# Patient Record
Sex: MALE | Race: BLACK OR AFRICAN AMERICAN | NOT HISPANIC OR LATINO
[De-identification: names, ages, dates, MRNs, and addresses within clinical notes are randomized per-mention and may not be internally consistent; named-entity substitution may affect disease eponyms.]

---

## 2018-04-02 ENCOUNTER — APPOINTMENT (OUTPATIENT)
Dept: ANTEPARTUM | Facility: CLINIC | Age: 43
End: 2018-04-02

## 2018-04-02 PROBLEM — Z00.00 ENCOUNTER FOR PREVENTIVE HEALTH EXAMINATION: Status: ACTIVE | Noted: 2018-04-02

## 2018-10-07 ENCOUNTER — TRANSCRIPTION ENCOUNTER (OUTPATIENT)
Age: 43
End: 2018-10-07

## 2018-10-08 ENCOUNTER — OUTPATIENT (OUTPATIENT)
Dept: OUTPATIENT SERVICES | Facility: HOSPITAL | Age: 43
LOS: 1 days | End: 2018-10-08
Payer: COMMERCIAL

## 2018-10-08 DIAGNOSIS — M77.02 MEDIAL EPICONDYLITIS, LEFT ELBOW: ICD-10-CM

## 2018-11-01 PROCEDURE — 76000 FLUOROSCOPY <1 HR PHYS/QHP: CPT

## 2018-11-01 PROCEDURE — 77002 NEEDLE LOCALIZATION BY XRAY: CPT

## 2018-11-01 PROCEDURE — 20605 DRAIN/INJ JOINT/BURSA W/O US: CPT | Mod: XS,LT

## 2018-11-01 PROCEDURE — 62321 NJX INTERLAMINAR CRV/THRC: CPT

## 2020-02-13 ENCOUNTER — EMERGENCY (EMERGENCY)
Facility: HOSPITAL | Age: 45
LOS: 1 days | Discharge: HOME | End: 2020-02-13
Attending: EMERGENCY MEDICINE
Payer: COMMERCIAL

## 2020-02-13 VITALS
TEMPERATURE: 98 F | RESPIRATION RATE: 18 BRPM | WEIGHT: 154.98 LBS | DIASTOLIC BLOOD PRESSURE: 91 MMHG | HEART RATE: 71 BPM | SYSTOLIC BLOOD PRESSURE: 123 MMHG | HEIGHT: 72 IN | OXYGEN SATURATION: 99 %

## 2020-02-13 LAB
ALBUMIN SERPL ELPH-MCNC: 4.7 G/DL — SIGNIFICANT CHANGE UP (ref 3.3–5.2)
ALP SERPL-CCNC: 77 U/L — SIGNIFICANT CHANGE UP (ref 40–120)
ALT FLD-CCNC: 14 U/L — SIGNIFICANT CHANGE UP
ANION GAP SERPL CALC-SCNC: 12 MMOL/L — SIGNIFICANT CHANGE UP (ref 5–17)
AST SERPL-CCNC: 23 U/L — SIGNIFICANT CHANGE UP
BASOPHILS # BLD AUTO: 0.02 K/UL — SIGNIFICANT CHANGE UP (ref 0–0.2)
BASOPHILS NFR BLD AUTO: 0.3 % — SIGNIFICANT CHANGE UP (ref 0–2)
BILIRUB SERPL-MCNC: 0.7 MG/DL — SIGNIFICANT CHANGE UP (ref 0.4–2)
BUN SERPL-MCNC: 27 MG/DL — HIGH (ref 8–20)
CALCIUM SERPL-MCNC: 9.7 MG/DL — SIGNIFICANT CHANGE UP (ref 8.6–10.2)
CHLORIDE SERPL-SCNC: 97 MMOL/L — LOW (ref 98–107)
CO2 SERPL-SCNC: 27 MMOL/L — SIGNIFICANT CHANGE UP (ref 22–29)
CREAT SERPL-MCNC: 1.17 MG/DL — SIGNIFICANT CHANGE UP (ref 0.5–1.3)
EOSINOPHIL # BLD AUTO: 0.04 K/UL — SIGNIFICANT CHANGE UP (ref 0–0.5)
EOSINOPHIL NFR BLD AUTO: 0.7 % — SIGNIFICANT CHANGE UP (ref 0–6)
GLUCOSE SERPL-MCNC: 92 MG/DL — SIGNIFICANT CHANGE UP (ref 70–99)
HCT VFR BLD CALC: 46.7 % — SIGNIFICANT CHANGE UP (ref 39–50)
HGB BLD-MCNC: 16.4 G/DL — SIGNIFICANT CHANGE UP (ref 13–17)
HIV 1 & 2 AB SERPL IA.RAPID: SIGNIFICANT CHANGE UP
IMM GRANULOCYTES NFR BLD AUTO: 0.3 % — SIGNIFICANT CHANGE UP (ref 0–1.5)
LIDOCAIN IGE QN: 16 U/L — LOW (ref 22–51)
LYMPHOCYTES # BLD AUTO: 1.88 K/UL — SIGNIFICANT CHANGE UP (ref 1–3.3)
LYMPHOCYTES # BLD AUTO: 32.7 % — SIGNIFICANT CHANGE UP (ref 13–44)
MCHC RBC-ENTMCNC: 28 PG — SIGNIFICANT CHANGE UP (ref 27–34)
MCHC RBC-ENTMCNC: 35.1 GM/DL — SIGNIFICANT CHANGE UP (ref 32–36)
MCV RBC AUTO: 79.8 FL — LOW (ref 80–100)
MONOCYTES # BLD AUTO: 0.47 K/UL — SIGNIFICANT CHANGE UP (ref 0–0.9)
MONOCYTES NFR BLD AUTO: 8.2 % — SIGNIFICANT CHANGE UP (ref 2–14)
NEUTROPHILS # BLD AUTO: 3.32 K/UL — SIGNIFICANT CHANGE UP (ref 1.8–7.4)
NEUTROPHILS NFR BLD AUTO: 57.8 % — SIGNIFICANT CHANGE UP (ref 43–77)
PLATELET # BLD AUTO: 177 K/UL — SIGNIFICANT CHANGE UP (ref 150–400)
POTASSIUM SERPL-MCNC: 5.1 MMOL/L — SIGNIFICANT CHANGE UP (ref 3.5–5.3)
POTASSIUM SERPL-SCNC: 5.1 MMOL/L — SIGNIFICANT CHANGE UP (ref 3.5–5.3)
PROT SERPL-MCNC: 8.4 G/DL — SIGNIFICANT CHANGE UP (ref 6.6–8.7)
RBC # BLD: 5.85 M/UL — HIGH (ref 4.2–5.8)
RBC # FLD: 12.6 % — SIGNIFICANT CHANGE UP (ref 10.3–14.5)
SODIUM SERPL-SCNC: 136 MMOL/L — SIGNIFICANT CHANGE UP (ref 135–145)
WBC # BLD: 5.75 K/UL — SIGNIFICANT CHANGE UP (ref 3.8–10.5)
WBC # FLD AUTO: 5.75 K/UL — SIGNIFICANT CHANGE UP (ref 3.8–10.5)

## 2020-02-13 PROCEDURE — 96374 THER/PROPH/DIAG INJ IV PUSH: CPT

## 2020-02-13 PROCEDURE — 86703 HIV-1/HIV-2 1 RESULT ANTBDY: CPT

## 2020-02-13 PROCEDURE — 99284 EMERGENCY DEPT VISIT MOD MDM: CPT

## 2020-02-13 PROCEDURE — 83690 ASSAY OF LIPASE: CPT

## 2020-02-13 PROCEDURE — 80053 COMPREHEN METABOLIC PANEL: CPT

## 2020-02-13 PROCEDURE — 99284 EMERGENCY DEPT VISIT MOD MDM: CPT | Mod: 25

## 2020-02-13 PROCEDURE — 85027 COMPLETE CBC AUTOMATED: CPT

## 2020-02-13 PROCEDURE — 36415 COLL VENOUS BLD VENIPUNCTURE: CPT

## 2020-02-13 RX ORDER — ONDANSETRON 8 MG/1
1 TABLET, FILM COATED ORAL
Qty: 9 | Refills: 0
Start: 2020-02-13 | End: 2020-02-15

## 2020-02-13 RX ORDER — ONDANSETRON 8 MG/1
4 TABLET, FILM COATED ORAL ONCE
Refills: 0 | Status: COMPLETED | OUTPATIENT
Start: 2020-02-13 | End: 2020-02-13

## 2020-02-13 RX ORDER — SODIUM CHLORIDE 9 MG/ML
1000 INJECTION INTRAMUSCULAR; INTRAVENOUS; SUBCUTANEOUS ONCE
Refills: 0 | Status: COMPLETED | OUTPATIENT
Start: 2020-02-13 | End: 2020-02-13

## 2020-02-13 RX ADMIN — SODIUM CHLORIDE 1000 MILLILITER(S): 9 INJECTION INTRAMUSCULAR; INTRAVENOUS; SUBCUTANEOUS at 14:35

## 2020-02-13 RX ADMIN — ONDANSETRON 4 MILLIGRAM(S): 8 TABLET, FILM COATED ORAL at 14:34

## 2020-02-13 NOTE — ED STATDOCS - PATIENT PORTAL LINK FT
You can access the FollowMyHealth Patient Portal offered by [facility] by registering at the following website: [email or web address]. By joining [employer]’s FollowMyHealth portal, you will also be able to view your health information using other applications (apps) compatible with our system.

## 2020-02-13 NOTE — ED STATDOCS - PROGRESS NOTE DETAILS
PA note: Labs unremarkable. Pt feeling well at this time, tolerating PO intake. No abdominal tenderness on exam. Pt provided with rx for zofran and instructed to f/u pmd. Stable for dc

## 2020-02-13 NOTE — ED STATDOCS - NSFOLLOWUPINSTRUCTIONS_ED_ALL_ED_FT
- Follow up with your doctor within 2-3 days.   - Return to the ED for any new or worsening symptoms.   - Use zofran 4mg ODT every 8 hours as needed for vomiting     Nausea / Vomiting    Nausea is the feeling that you have to vomit. As nausea gets worse, it can lead to vomiting. Vomiting puts you at an increased risk for dehydration. Older adults and people with other diseases or a weak immune system are at higher risk for dehydration. Drink clear fluids in small but frequent amounts as tolerated. Eat bland, easy-to-digest foods in small amounts as tolerated.    SEEK IMMEDIATE MEDICAL CARE IF YOU HAVE ANY OF THE FOLLOWING SYMPTOMS: fever, inability to keep sufficient fluids down, black or bloody vomitus, black or bloody stools, lightheadedness/dizziness, chest pain, severe headache, rash, shortness of breath, cold or clammy skin, confusion, pain with urination, or any signs of dehydration.

## 2020-02-13 NOTE — ED STATDOCS - CLINICAL SUMMARY MEDICAL DECISION MAKING FREE TEXT BOX
Patient presenting with flu like symptoms, N/V/D, myalgia. Unable to tolerate PO at home. Obtain labs. Give Zofran, fluids. Reassess.

## 2020-02-13 NOTE — ED STATDOCS - OBJECTIVE STATEMENT
45 y/o M pt with PMHx of cervical spine surgery presents to the ED c/o vomiting. Associated diarrhea, weakness, dizziness, headache, fatigue, body aches, sweats, fever, and abdominal pains, for several days. Believes he contracted the flu from his son. Limited PO intake at home. No recent travel. Denies rash, cough, congestion. No flu shot this year.

## 2020-02-13 NOTE — ED ADULT TRIAGE NOTE - CHIEF COMPLAINT QUOTE
Patient arrived to ED today with c/o diarrhea, vomiting, weakness, dizziness, headache, and abdominal pains.

## 2022-12-15 ENCOUNTER — EMERGENCY (EMERGENCY)
Facility: HOSPITAL | Age: 47
LOS: 1 days | Discharge: HOME | End: 2022-12-15
Attending: EMERGENCY MEDICINE
Payer: COMMERCIAL

## 2022-12-15 VITALS
OXYGEN SATURATION: 97 % | HEART RATE: 73 BPM | TEMPERATURE: 99 F | SYSTOLIC BLOOD PRESSURE: 112 MMHG | RESPIRATION RATE: 18 BRPM | DIASTOLIC BLOOD PRESSURE: 76 MMHG

## 2022-12-15 LAB
FLUAV H1 2009 PAND RNA SPEC QL NAA+PROBE: DETECTED
FLUAV SUBTYP SPEC NAA+PROBE: DETECTED
RAPID RVP RESULT: DETECTED
SARS-COV-2 RNA SPEC QL NAA+PROBE: SIGNIFICANT CHANGE UP

## 2022-12-15 PROCEDURE — 99283 EMERGENCY DEPT VISIT LOW MDM: CPT

## 2022-12-15 PROCEDURE — 0225U NFCT DS DNA&RNA 21 SARSCOV2: CPT

## 2022-12-15 RX ORDER — IBUPROFEN 200 MG
600 TABLET ORAL ONCE
Refills: 0 | Status: COMPLETED | OUTPATIENT
Start: 2022-12-15 | End: 2022-12-15

## 2022-12-15 RX ADMIN — Medication 600 MILLIGRAM(S): at 19:15

## 2022-12-15 NOTE — ED STATDOCS - CLINICAL SUMMARY MEDICAL DECISION MAKING FREE TEXT BOX
46 y/o with 4 days with viral syndrome: general myalgia, sweats, nausea and diarrhea. Will get RVP, give Motrin and discharge.

## 2022-12-15 NOTE — ED STATDOCS - OBJECTIVE STATEMENT
46 y/o male with no pmhx, c/o 4 days of cold sweats, body aches, and runny nose with new cough. +sick contact (nephew). Pt is not flu vaccinated. Denies fever or vomiting. +diarrhea this morning and nausea.

## 2022-12-15 NOTE — ED STATDOCS - PATIENT PORTAL LINK FT
You can access the FollowMyHealth Patient Portal offered by [facility] by registering at the following website: [email or web address]. By joining [employer]’s FollowMyHealth portal, you will also be able to view your health information using other applications (apps) compatible with our system.

## 2022-12-16 PROBLEM — Z78.9 OTHER SPECIFIED HEALTH STATUS: Chronic | Status: ACTIVE | Noted: 2020-02-19

## 2023-05-20 ENCOUNTER — EMERGENCY (EMERGENCY)
Facility: HOSPITAL | Age: 48
LOS: 1 days | Discharge: HOME | End: 2023-05-20
Attending: EMERGENCY MEDICINE
Payer: COMMERCIAL

## 2023-05-20 VITALS
RESPIRATION RATE: 18 BRPM | HEART RATE: 65 BPM | HEIGHT: 72 IN | SYSTOLIC BLOOD PRESSURE: 131 MMHG | TEMPERATURE: 99 F | DIASTOLIC BLOOD PRESSURE: 86 MMHG | OXYGEN SATURATION: 95 % | WEIGHT: 145.06 LBS

## 2023-05-20 LAB
APPEARANCE UR: CLEAR — SIGNIFICANT CHANGE UP
BACTERIA # UR AUTO: ABNORMAL
BILIRUB UR-MCNC: NEGATIVE — SIGNIFICANT CHANGE UP
COLOR SPEC: YELLOW — SIGNIFICANT CHANGE UP
DIFF PNL FLD: ABNORMAL
EPI CELLS # UR: SIGNIFICANT CHANGE UP
GLUCOSE UR QL: NEGATIVE MG/DL — SIGNIFICANT CHANGE UP
HIV 1 & 2 AB SERPL IA.RAPID: SIGNIFICANT CHANGE UP
KETONES UR-MCNC: ABNORMAL
LEUKOCYTE ESTERASE UR-ACNC: ABNORMAL
NITRITE UR-MCNC: NEGATIVE — SIGNIFICANT CHANGE UP
PH UR: 6 — SIGNIFICANT CHANGE UP (ref 5–8)
PROT UR-MCNC: NEGATIVE — SIGNIFICANT CHANGE UP
RBC CASTS # UR COMP ASSIST: SIGNIFICANT CHANGE UP /HPF (ref 0–4)
SP GR SPEC: 1.02 — SIGNIFICANT CHANGE UP (ref 1.01–1.02)
T PALLIDUM AB TITR SER: NEGATIVE — SIGNIFICANT CHANGE UP
UROBILINOGEN FLD QL: 1 MG/DL
WBC UR QL: ABNORMAL /HPF (ref 0–5)

## 2023-05-20 PROCEDURE — 99284 EMERGENCY DEPT VISIT MOD MDM: CPT

## 2023-05-20 PROCEDURE — 36415 COLL VENOUS BLD VENIPUNCTURE: CPT

## 2023-05-20 PROCEDURE — 87591 N.GONORRHOEAE DNA AMP PROB: CPT

## 2023-05-20 PROCEDURE — 81001 URINALYSIS AUTO W/SCOPE: CPT

## 2023-05-20 PROCEDURE — 87086 URINE CULTURE/COLONY COUNT: CPT

## 2023-05-20 PROCEDURE — 99283 EMERGENCY DEPT VISIT LOW MDM: CPT

## 2023-05-20 PROCEDURE — 87491 CHLMYD TRACH DNA AMP PROBE: CPT

## 2023-05-20 PROCEDURE — 86780 TREPONEMA PALLIDUM: CPT

## 2023-05-20 PROCEDURE — 86703 HIV-1/HIV-2 1 RESULT ANTBDY: CPT

## 2023-05-20 RX ORDER — CIPROFLOXACIN LACTATE 400MG/40ML
1 VIAL (ML) INTRAVENOUS
Qty: 14 | Refills: 0
Start: 2023-05-20 | End: 2023-05-26

## 2023-05-20 RX ORDER — CIPROFLOXACIN LACTATE 400MG/40ML
500 VIAL (ML) INTRAVENOUS ONCE
Refills: 0 | Status: COMPLETED | OUTPATIENT
Start: 2023-05-20 | End: 2023-05-20

## 2023-05-20 RX ADMIN — Medication 500 MILLIGRAM(S): at 04:14

## 2023-05-20 NOTE — ED PROVIDER NOTE - CARE PROVIDER_API CALL
Chucky Cisneros)  Urology  [address], [address]  [place] [address]  Phone: [phone]  Fax: [phone]  Follow Up Time:

## 2023-05-20 NOTE — ED ADULT TRIAGE NOTE - CHIEF COMPLAINT QUOTE
Patient presents to ED with c/o urinary symptoms, frequent urination and urgency times 3 days ago.  Denies burning on urination/pain.  Patient has had same symptoms in the past with UTI.

## 2023-05-20 NOTE — ED PROVIDER NOTE - ATTENDING APP SHARED VISIT CONTRIBUTION OF CARE
46 yo M c/o UTI s/s with assoc urinary urgency/frequency x last 3 days.  pt only sexually active with 1 partner.  No assoc fever, vomiting, testicular pain, penile d.c or lesions  or back pain.  UA as noted.  Rx Cipro with referral to Urology.  Pt requesting testing for STI's

## 2023-05-20 NOTE — ED ADULT NURSE NOTE - OBJECTIVE STATEMENT
Pt presents to ED c/o urinary symptoms. Pt states he has been experiencing urinary frequency and urgency for past 3 days. Pt states he had sex 3 days ago and then symptoms began. PMH UTI 1 year ago and pt states his symptoms are similar to when he had a UTI. Denies fever, chills, n/v, discharge, abdominal or groin pain.

## 2023-05-20 NOTE — ED PROVIDER NOTE - CLINICAL SUMMARY MEDICAL DECISION MAKING FREE TEXT BOX
48 yo male with no pmhx presents with urinary urgency/frequency x3 days. UA with 26-50 WBC's and mod leuk esterase, abx given. urology f/u explained. strict return precautions explained

## 2023-05-20 NOTE — ED PROVIDER NOTE - PHYSICAL EXAMINATION
Gen: No acute distress, non toxic  HEENT: Mucous membranes moist, pink conjunctivae, EOMI. PERRL. Airway patent   CV: RRR, nl s1/s2.  Resp: CTAB, normal rate and effort  GI: Abdomen soft, NT, ND. No rebound, no guarding  : No CVAT b/l  Neuro: A&O x 3, moving all 4 extremities  MSK: No spine or joint tenderness to palpation  Skin: No rashes. intact and perfused.

## 2023-05-20 NOTE — ED PROVIDER NOTE - PATIENT PORTAL LINK FT
You can access the FollowMyHealth Patient Portal offered by [facility] by registering at the following website: [email or web address]. By joining [employer]’s FollowMyHealth portal, you will also be able to view your health information using other applications (apps) compatible with our system.

## 2023-05-20 NOTE — ED PROVIDER NOTE - NSFOLLOWUPINSTRUCTIONS_ED_ALL_ED_FT
- Please follow-up with your primary care doctor in the next 5-7 days.  Please call tomorrow for an appointment.  If you cannot follow-up with your primary care doctor please return to the ED for any urgent issues.  - You were given a copy of the tests performed today.  Please bring the results with you and review them with your primary care doctor.  - If you have any worsening of symptoms or any other concerns please return to the ED immediately.  - Please continue taking your home medications as directed.   - Take antibiotics as prescribed. Take medication with food to prevent upset stomach.   - Follow up with the urologist within 2-3 days.     Urinary Tract Infection    A urinary tract infection (UTI) is an infection of any part of the urinary tract, which includes the kidneys, ureters, bladder, and urethra. Risk factors include ignoring your need to urinate, wiping back to front if female, being an uncircumcised male, and having diabetes or a weak immune system. Symptoms include frequent urination, pain or burning with urination, foul smelling urine, cloudy urine, pain in the lower abdomen, blood in the urine, and fever. If you were prescribed an antibiotic medicine, take it as told by your health care provider. Do not stop taking the antibiotic even if you start to feel better.    SEEK IMMEDIATE MEDICAL CARE IF YOU HAVE ANY OF THE FOLLOWING SYMPTOMS: severe back or abdominal pain, fever, inability to keep fluids or medicine down, dizziness/lightheadedness, or a change in mental status.

## 2023-05-21 LAB
CULTURE RESULTS: SIGNIFICANT CHANGE UP
SPECIMEN SOURCE: SIGNIFICANT CHANGE UP

## 2023-05-22 LAB
C TRACH RRNA SPEC QL NAA+PROBE: DETECTED
N GONORRHOEA RRNA SPEC QL NAA+PROBE: SIGNIFICANT CHANGE UP

## 2023-06-01 ENCOUNTER — EMERGENCY (EMERGENCY)
Facility: HOSPITAL | Age: 48
LOS: 1 days | Discharge: HOME | End: 2023-06-01
Attending: EMERGENCY MEDICINE
Payer: COMMERCIAL

## 2023-06-01 VITALS
RESPIRATION RATE: 16 BRPM | HEART RATE: 71 BPM | HEIGHT: 72 IN | DIASTOLIC BLOOD PRESSURE: 94 MMHG | WEIGHT: 149.91 LBS | SYSTOLIC BLOOD PRESSURE: 157 MMHG | OXYGEN SATURATION: 99 % | TEMPERATURE: 98 F

## 2023-06-01 PROCEDURE — 72125 CT NECK SPINE W/O DYE: CPT | Mod: 26,MA

## 2023-06-01 PROCEDURE — 99285 EMERGENCY DEPT VISIT HI MDM: CPT

## 2023-06-01 PROCEDURE — 73110 X-RAY EXAM OF WRIST: CPT | Mod: 26,RT

## 2023-06-01 PROCEDURE — 71046 X-RAY EXAM CHEST 2 VIEWS: CPT | Mod: 26

## 2023-06-01 RX ORDER — ACETAMINOPHEN 500 MG
650 TABLET ORAL ONCE
Refills: 0 | Status: COMPLETED | OUTPATIENT
Start: 2023-06-01 | End: 2023-06-01

## 2023-06-01 RX ADMIN — Medication 100 MILLIGRAM(S): at 17:29

## 2023-06-01 RX ADMIN — Medication 650 MILLIGRAM(S): at 17:29
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
## 2023-06-01 NOTE — ED PROVIDER NOTE - NSFOLLOWUPINSTRUCTIONS_ED_ALL_ED_FT
Complete all medications for chlamydia.   Motrin and Tylenol for pain.   Return for any worsening symptoms.
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
## 2023-06-01 NOTE — ED PROVIDER NOTE - MUSCULOSKELETAL MINIMAL EXAM
c spine ttp m/l and left trapezius tenderness. Right wrist palmar ttp. to ROM. Decreased sensation right hand, 5/5  strength.
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
## 2023-06-01 NOTE — ED PROVIDER NOTE - CARDIAC, MLM
Normal rate, regular rhythm.  Heart sounds S1, S2.  No murmurs, rubs or gallops. +ttp right anterior chest

## 2023-06-01 NOTE — ED PROVIDER NOTE - PATIENT PORTAL LINK FT
You can access the FollowMyHealth Patient Portal offered by [facility] by registering at the following website: [email or web address]. By joining [employer]’s FollowMyHealth portal, you will also be able to view your health information using other applications (apps) compatible with our system.

## 2023-06-02 PROCEDURE — 71046 X-RAY EXAM CHEST 2 VIEWS: CPT

## 2023-06-02 PROCEDURE — 99284 EMERGENCY DEPT VISIT MOD MDM: CPT

## 2023-06-02 PROCEDURE — 72125 CT NECK SPINE W/O DYE: CPT | Mod: MA

## 2023-06-02 PROCEDURE — 73110 X-RAY EXAM OF WRIST: CPT

## 2023-06-03 ENCOUNTER — EMERGENCY (EMERGENCY)
Facility: HOSPITAL | Age: 48
LOS: 1 days | Discharge: HOME | End: 2023-06-03
Attending: EMERGENCY MEDICINE
Payer: COMMERCIAL

## 2023-06-03 VITALS
DIASTOLIC BLOOD PRESSURE: 88 MMHG | OXYGEN SATURATION: 98 % | WEIGHT: 137.13 LBS | HEIGHT: 72 IN | HEART RATE: 84 BPM | TEMPERATURE: 97 F | RESPIRATION RATE: 18 BRPM | SYSTOLIC BLOOD PRESSURE: 151 MMHG

## 2023-06-03 PROCEDURE — 99283 EMERGENCY DEPT VISIT LOW MDM: CPT

## 2023-06-03 PROCEDURE — 99282 EMERGENCY DEPT VISIT SF MDM: CPT
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
## 2023-06-03 NOTE — ED PROVIDER NOTE - PATIENT PORTAL LINK FT
You can access the FollowMyHealth Patient Portal offered by [facility] by registering at the following website: [email or web address]. By joining [employer]’s FollowMyHealth portal, you will also be able to view your health information using other applications (apps) compatible with our system.
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
## 2023-06-03 NOTE — ED PROCEDURE NOTE - PROCEDURE DATE TIME, MLM
03-Jun-2023 02:57 Closure 2 Information: This tab is for additional flaps and grafts, including complex repair and grafts and complex repair and flaps. You can also specify a different location for the additional defect, if the location is the same you do not need to select a new one. We will insert the automated text for the repair you select below just as we do for solitary flaps and grafts. Please note that at this time if you select a location with a different insurance zone you will need to override the ICD10 and CPT if appropriate.

## 2023-06-03 NOTE — ED PROCEDURE NOTE - CPROC ED TIME OUT STATEMENT1
“Patient's name, , procedure and correct site were confirmed during the [place] Timeout.” Statement Selected

## 2023-06-03 NOTE — ED PROVIDER NOTE - NSFOLLOWUPINSTRUCTIONS_ED_ALL_ED_FT
Fracture    A fracture is a break in one of your bones. This can occur from a variety of injuries, especially traumatic ones. Symptoms include pain, bruising, or swelling. Do not use the injured limb. If a fracture is in one of the bones below your waist, do not put weight on that limb unless instructed to do so by your healthcare provider. Crutches or a cane may have been provided. A splint or cast may have been applied by your health care provider. Make sure to keep it dry and follow up with an orthopedist as instructed.    SEEK IMMEDIATE MEDICAL CARE IF YOU HAVE ANY OF THE FOLLOWING SYMPTOMS: numbness, tingling, increasing pain, or weakness in any part of the injured limb.     Chest Pain    Chest pain can be caused by many different conditions which may or may not be dangerous. Causes include heartburn, lung infections, heart attack, blood clot in lungs, skin infections, strain or damage to muscle, cartilage, or bones, etc. In addition to a history and physical examination, an electrocardiogram (ECG) or other lab tests may have been performed to determine the cause of your chest pain. Follow up with your primary care provider or with a cardiologist as instructed.     SEEK IMMEDIATE MEDICAL CARE IF YOU HAVE ANY OF THE FOLLOWING SYMPTOMS: worsening chest pain, coughing up blood, unexplained back/neck/jaw pain, severe abdominal pain, dizziness or lightheadedness, fainting, shortness of breath, sweaty or clammy skin, vomiting, or racing heart beat. These symptoms may represent a serious problem that is an emergency. Do not wait to see if the symptoms will go away. Get medical help right away. Call 911 and do not drive yourself to the hospital.
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
## 2023-06-16 ENCOUNTER — APPOINTMENT (OUTPATIENT)
Dept: ORTHOPEDIC SURGERY | Facility: CLINIC | Age: 48
End: 2023-06-16
Payer: MEDICAID

## 2023-06-16 VITALS
HEART RATE: 66 BPM | BODY MASS INDEX: 20.05 KG/M2 | WEIGHT: 148 LBS | TEMPERATURE: 98.1 F | HEIGHT: 72 IN | DIASTOLIC BLOOD PRESSURE: 84 MMHG | SYSTOLIC BLOOD PRESSURE: 124 MMHG

## 2023-06-16 PROCEDURE — 99204 OFFICE O/P NEW MOD 45 MIN: CPT

## 2023-06-16 PROCEDURE — 72040 X-RAY EXAM NECK SPINE 2-3 VW: CPT

## 2023-06-16 RX ORDER — METHYLPREDNISOLONE 4 MG/1
4 TABLET ORAL
Qty: 1 | Refills: 0 | Status: ACTIVE | COMMUNITY
Start: 2023-06-16 | End: 1900-01-01

## 2023-06-16 NOTE — DISCUSSION/SUMMARY
[de-identified] : Patient will be placed on a course of physical therapy short course of anti-inflammatories as well with regard to being status post a two-level stand-alone device patient can follow-up on an as-needed basis for cervical spine management.

## 2023-06-16 NOTE — PHYSICAL EXAM
[de-identified] : CONSTITUTIONAL:  Patient is a very pleasant individual who is well-nourished and appears stated age. \par PSYCHIATRIC:  Alert and oriented times three and in no apparent distress, and participates with orthopedic evaluation well.\par HEAD:  Atraumatic and  nonsyndromic in appearance.\par EENT: No thyromegaly, EOMI.\par RESPIRATORY:  Respiratory rate is regular, not dyspneic on examination.\par LYMPHATICS:  There is no cervical or axillary lymphadenopathy.\par INTEGUMENTARY:  Skin is clean, dry, and intact about the bilateral upper extremities and cervical spine. \par VASCULAR:   There is brisk capillary refill about the bilateral upper extremities and radial pulses are 2/4. \par NEUROLOGIC:  Negative L'hirmitte, negative Spurling's sign. There are no pathologic reflexes. There is no decrease in sensation of the bilateral upper extremities on Wartenberg pinwheel examination.  Deep tendon reflexes are well-maintained at +2/4 of the bilateral upper extremities and are symmetric.\par MUSCULOSKELETAL:  There is no visible muscular atrophy.  Manual motor strength is well maintained in the bilateral upper extremities.  Cervical range of motion is well maintained.  The patient ambulates in a non-myelopathic manner. Normal secondary orthopaedic exam of bilateral shoulders, elbows and hands.  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  CT of the cervical spine was reviewed from [place] well demonstrates cervical spondylosis and I believe incomplete healing of these 2 arthrodeses
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
## 2023-07-10 ENCOUNTER — APPOINTMENT (OUTPATIENT)
Dept: ORTHOPEDIC SURGERY | Facility: CLINIC | Age: 48
End: 2023-07-10
Payer: MEDICAID

## 2023-07-10 VITALS
DIASTOLIC BLOOD PRESSURE: 85 MMHG | WEIGHT: 148 LBS | HEART RATE: 60 BPM | SYSTOLIC BLOOD PRESSURE: 131 MMHG | BODY MASS INDEX: 20.05 KG/M2 | HEIGHT: 72 IN

## 2023-07-10 DIAGNOSIS — M65.4 RADIAL STYLOID TENOSYNOVITIS [DE QUERVAIN]: ICD-10-CM

## 2023-07-10 DIAGNOSIS — M25.531 PAIN IN RIGHT WRIST: ICD-10-CM

## 2023-07-10 PROCEDURE — 99214 OFFICE O/P EST MOD 30 MIN: CPT | Mod: 25

## 2023-07-10 PROCEDURE — 20550 NJX 1 TENDON SHEATH/LIGAMENT: CPT | Mod: RT

## 2023-07-10 PROCEDURE — 73110 X-RAY EXAM OF WRIST: CPT | Mod: LT,RT
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
## 2023-07-10 NOTE — PHYSICAL EXAM
[de-identified] : Examination of the [right] wrist reveals tenderness at the level of the first dorsal compartment with a positive finkelstein's examination\par There is a positive Tinel at the DRSM with subjective numbness throughout the dorsum of the hand thumb through small [de-identified] : [4] views of [bilateral hands and wrists] were obtained today in my office and were seen by me and discussed with the patient. \par These are grossly unremarkable\par
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
## 2023-09-16 NOTE — ED PROVIDER NOTE - NSICDXNOPASTSURGICALHX_GEN_ALL_ED
Mangum Regional Medical Center – Mangum Cristian Cabral MD  [facility] Hospitalist  Please contact the above Hospitalist from 7AM until 7pm.   From 7pm to 7am please contact the Hospitalist on call at [phone]     <-- Click to add NO significant Past Surgical History

## 2023-09-26 ENCOUNTER — APPOINTMENT (OUTPATIENT)
Dept: ORTHOPEDIC SURGERY | Facility: CLINIC | Age: 48
End: 2023-09-26
Payer: MEDICAID

## 2023-09-26 VITALS — WEIGHT: 148 LBS | BODY MASS INDEX: 20.05 KG/M2 | HEIGHT: 72 IN

## 2023-09-26 DIAGNOSIS — M47.812 SPONDYLOSIS W/OUT MYELOPATHY OR RADICULOPATHY, CERVICAL REGION: ICD-10-CM

## 2023-09-26 DIAGNOSIS — Z98.1 ARTHRODESIS STATUS: ICD-10-CM

## 2023-09-26 PROCEDURE — 99213 OFFICE O/P EST LOW 20 MIN: CPT

## 2024-04-14 ENCOUNTER — EMERGENCY (EMERGENCY)
Facility: HOSPITAL | Age: 49
LOS: 1 days | Discharge: HOME | End: 2024-04-14
Attending: STUDENT IN AN ORGANIZED HEALTH CARE EDUCATION/TRAINING PROGRAM
Payer: COMMERCIAL

## 2024-04-14 VITALS
SYSTOLIC BLOOD PRESSURE: 155 MMHG | RESPIRATION RATE: 20 BRPM | HEART RATE: 67 BPM | DIASTOLIC BLOOD PRESSURE: 101 MMHG | TEMPERATURE: 98 F | OXYGEN SATURATION: 99 % | WEIGHT: 140.65 LBS

## 2024-04-14 PROCEDURE — 99283 EMERGENCY DEPT VISIT LOW MDM: CPT

## 2024-04-14 PROCEDURE — 99284 EMERGENCY DEPT VISIT MOD MDM: CPT

## 2024-04-14 PROCEDURE — 96372 THER/PROPH/DIAG INJ SC/IM: CPT

## 2024-04-14 RX ORDER — KETOROLAC TROMETHAMINE 30 MG/ML
60 SYRINGE (ML) INJECTION ONCE
Refills: 0 | Status: DISCONTINUED | OUTPATIENT
Start: 2024-04-14 | End: 2024-04-14

## 2024-04-14 RX ADMIN — Medication 60 MILLIGRAM(S): at 17:39
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
## 2024-04-14 NOTE — ED ADULT NURSE NOTE - NSFALLRISKASMTTYPE_ED_ALL_ED
Sunscreen Recommendations: Daily application, spf 30+ Detail Level: Detailed Sunscreen Recommendations: spf 30+ Detail Level: Generalized Detail Level: Zone Moisturizer Recommendations: Amlactin Initial (On Arrival)

## 2024-04-14 NOTE — ED PROVIDER NOTE - PATIENT PORTAL LINK FT
You can access the FollowMyHealth Patient Portal offered by [facility] by registering at the following website: [email or web address]. By joining [employer]’s FollowMyHealth portal, you will also be able to view your health information using other applications (apps) compatible with our system.

## 2024-04-14 NOTE — ED PROVIDER NOTE - CARE PROVIDER_API CALL
Uzma Mcdermott  Pain Medicine  [address] [place] [address], [place] [address]  Phone: [phone]  Fax: [phone]  Follow Up Time:
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
## 2025-02-26 NOTE — ED ADULT NURSE NOTE - NSFALLRISKASMTTYPE_ED_ALL_ED
Chief Complaint:   Chief Complaint   Patient presents with    Brain Tumor     Patient here for 4wk post op visit. He had a brain biopsy on 1/16/25.       Ap Palmer is a 72 y.o. male.   History of Present Illness  The patient presents for a follow-up of grade 4 glioma, status post biopsy.    He sought a second opinion from Dr. Meyers at [place], who consulted with Dr. Mason. They concurred that the tumor is typically seen in children, making its presence in an adult unusual. A comprehensive treatment plan was devised, which includes radiation therapy 5 days a week and chemotherapy with temozolomide. He is also on a low-dose steroid regimen to prevent edema recurrence. His cognitive function remains largely intact, with occasional slow responses. No new seizures, weakness, or numbness have been reported. He has not experienced any microseizures, but driving restrictions remain in place. His appetite is robust, and he has not experienced nausea. Mild fatigue is present, but it is not severe. An MRI is scheduled for 04/29/2025, and his treatment is expected to conclude at the end of 03/2025. He is currently on a 42-day course of chemotherapy (180 mg) and radiation therapy 5 days a week. A consultation with Dr. Jimenes is planned for the following day. He is also taking folic acid, B1, and an antibiotic to prevent pneumonia. The possibility of proton therapy was discussed.    MEDICATIONS  temozolomide, folic acid, thiamine, antibiotic     ECOG/WHO: (0) Fully Active - Able to Carry On All Pre-disease Performance Without Restriction  KPS: 90:  Minor signs or symptoms      Review of Systems   Constitutional: Negative.    HENT: Negative.     Eyes: Negative.    Respiratory: Negative.     Cardiovascular: Negative.    Gastrointestinal: Negative.    Endocrine: Negative.    Genitourinary: Negative.    Musculoskeletal: Negative.    Skin: Negative.    Allergic/Immunologic: Negative.    Neurological: Negative.     Hematological: Negative.    Psychiatric/Behavioral:  Positive for confusion.        Past Medical History:   Diagnosis Date    Arthritis     Asthma     Claustrophobia 1958    CTS (carpal tunnel syndrome) 1993    Surgical correction    Dental disease     GERD (gastroesophageal reflux disease)     Glioblastoma multiforme - IDH wild type 01/29/2025    WHO IV, +MGMT promoter methylation    Headache     migraines    HL (hearing loss)     Hypertension     Kidney stone     Low back pain     Unknown    PAT (paroxysmal atrial tachycardia)     states one episode a long time ago    Seizure 01/14/2025    Sleep apnea        Past Surgical History:   Procedure Laterality Date    BACK SURGERY      piriformis surgery    CARPAL TUNNEL RELEASE Right     CHOLECYSTECTOMY      CRANIOTOMY Left 1/16/2025    Procedure: STEREOTACTIC BRAIN BIOPSY WITH STEALTH;  Surgeon: Santy Kirby MD;  Location:  [facility] OR;  Service: Neurosurgery;  Laterality: Left;    HYPOGLOSSAL NERVE STIMULATION DEVICE IMPLANT N/A 12/6/2024    Procedure: HYPOGLOSSAL NERVE STIMULATION DEVICE IMPLANT;  Surgeon: Gustabo Carter MD;  Location:  [facility] OR;  Service: ENT;  Laterality: N/A;    PIRIFORMUS INJECTION      SLEEP ENDOSCOPY N/A 9/20/2024    Procedure: Videosleep endoscopy;  Surgeon: Gustabo Carter MD;  Location:  [facility] OR;  Service: ENT;  Laterality: N/A;       Family History: family history includes Cancer in his brother and father; Diabetes in his mother.    Social History:  reports that he quit smoking about 35 years ago. His smoking use included cigarettes. He started smoking about 48 years ago. He has a 26 pack-year smoking history. He has never used smokeless tobacco. He reports current alcohol use of about 2.0 standard drinks of alcohol per week. He reports that he does not use drugs.    Medications:    Current Outpatient Medications:     vitamin B-12 (cyanocobalamin) 100 MCG tablet, Take 1 tablet by mouth Daily., Disp: , Rfl:      albuterol sulfate  (90 Base) MCG/ACT inhaler, Take 2 puffs by mouth Every 4 (Four) Hours As Needed (SOB, wheezing). Patient takes as needed, Disp: , Rfl:     ALPRAZolam (XANAX) 0.5 MG tablet, Take 1 tablet by mouth Daily As Needed for Anxiety., Disp: , Rfl:     Breo Ellipta 100-25 MCG/ACT aerosol powder , Inhale 1 puff Daily. Patient takes as needed, Disp: , Rfl:     dexAMETHasone (DECADRON) 2 MG tablet, Take 1 tablet by mouth 3 (Three) Times a Day With Meals., Disp: 90 tablet, Rfl: 0    dilTIAZem (CARDIZEM) 30 MG tablet, Take 1 tablet by mouth 2 (Two) Times a Day., Disp: , Rfl:     famotidine (PEPCID) 40 MG tablet, Take 1 tablet by mouth Daily., Disp: , Rfl:     folic acid (FOLVITE) 400 MCG tablet, Take 1 tablet by mouth Daily., Disp: 30 tablet, Rfl: 2    gabapentin (NEURONTIN) 300 MG capsule, Take 1 capsule by mouth 3 (Three) Times a Day., Disp: , Rfl:     glucosamine-chondroitin 500-400 MG capsule capsule, Take 2 capsules by mouth 2 (Two) Times a Day With Meals., Disp: , Rfl:     levETIRAcetam (Keppra) 1000 MG tablet, Take 1 tablet by mouth 2 (Two) Times a Day., Disp: 60 tablet, Rfl: 1    losartan (COZAAR) 50 MG tablet, Take 1 tablet by mouth 2 (Two) Times a Day., Disp: , Rfl:     meloxicam (MOBIC) 7.5 MG tablet, Take 1 tablet by mouth 2 (Two) Times a Day., Disp: , Rfl:     montelukast (SINGULAIR) 10 MG tablet, Take 1 tablet by mouth Every Night., Disp: , Rfl:     naloxone (NARCAN) 4 MG/0.1ML nasal spray, Call 911. Don't prime. [place] in 1 nostril for overdose. Repeat in 2-3 minutes in other nostril if no or minimal breathing/responsiveness., Disp: 2 each, Rfl: 0    ondansetron (ZOFRAN) 8 MG tablet, Take 1 tablet by mouth Every 8 (Eight) Hours As Needed for Nausea or Vomiting., Disp: 60 tablet, Rfl: 2    pantoprazole (PROTONIX) 40 MG EC tablet, Take 1 tablet by mouth Every Morning., Disp: 30 tablet, Rfl: 0    prochlorperazine (COMPAZINE) 10 MG tablet, Take 1 tablet by mouth Every 6 (Six) Hours As Needed for  Nausea or Vomiting., Disp: 60 tablet, Rfl: 2    rizatriptan (MAXALT) 10 MG tablet, Take 1 tablet by mouth 1 (One) Time As Needed for Migraine., Disp: , Rfl:     sildenafil (VIAGRA) 100 MG tablet, Take 1 tablet by mouth As Needed., Disp: , Rfl:     sulfamethoxazole-trimethoprim (BACTRIM DS,SEPTRA DS) 800-160 MG per tablet, Take 1 tablet by mouth Daily., Disp: 60 tablet, Rfl: 1    temozolomide (TEMODAR) 140 MG chemo capsule, Take 1 capsule by mouth with 1 other temozolomide prescription for 160 mg total Daily for 42 doses. Begin when you start radiation., Disp: 42 capsule, Rfl: 0    temozolomide (TEMODAR) 20 MG chemo capsule, Take 1 capsule by mouth with 1 other temozolomide prescription for 160 mg total Daily for 42 doses. Begin when you start radiation., Disp: 42 capsule, Rfl: 0    thiamine (VITAMIN B1) 100 MG tablet, Take 1 tablet by mouth Daily., Disp: 30 tablet, Rfl: 2    venlafaxine XR (EFFEXOR-XR) 75 MG 24 hr capsule, Take 1 capsule by mouth Daily., Disp: , Rfl:     Allergies:  Patient has no known allergies.    Objective   Physical Exam  Eyes:      General: Lids are normal.      Extraocular Movements: Extraocular movements intact.      Pupils: Pupils are equal, round, and reactive to light.   Neurological:      Coordination: Coordination is intact.      Deep Tendon Reflexes:      Reflex Scores:       Tricep reflexes are 2+ on the right side and 2+ on the left side.       Bicep reflexes are 2+ on the right side and 2+ on the left side.       Brachioradialis reflexes are 2+ on the right side and 2+ on the left side.       Patellar reflexes are 2+ on the right side and 2+ on the left side.       Achilles reflexes are 2+ on the right side and 2+ on the left side.  Psychiatric:         Speech: Speech normal.       Neurological Exam  Mental Status  Awake, alert and oriented to person, place and time. Speech is normal. Language is fluent with no aphasia. Attention and concentration are normal.    Cranial Nerves  CN  II: Visual acuity is normal.  CN III, IV, VI: Extraocular movements intact bilaterally. Normal lids and orbits bilaterally. Pupils equal round and reactive to light bilaterally.  CN V: Facial sensation is normal.  CN VII: Full and symmetric facial movement.  CN IX, X: Palate elevates symmetrically  CN XI: Shoulder shrug strength is normal.    Motor  Normal muscle bulk throughout. Normal muscle tone.                                               Right                     Left  Deltoid                                   5                          5   Biceps                                   5                          5   Triceps                                  5                          5   Wrist extensor                       5                          5   Finger flexor                          5                          5   Iliopsoas                               5                          5   Quadriceps                           5                          5   Gastrocnemius                     5                           5   Anterior tibialis                      5                          5  Extensor hallucis longus      5                           5    Sensory  Light touch is normal in upper and lower extremities.     Reflexes                                            Right                      Left  Brachioradialis                    2+                         2+  Biceps                                 2+                         2+  Triceps                                2+                         2+  Patellar                                2+                         2+  Achilles                                2+                         2+  Right Plantar: downgoing  Left Plantar: downgoing    Right pathological reflexes: Dipti's absent. Ankle clonus absent.  Left pathological reflexes: Dipti's absent. Ankle clonus absent.    Coordination    Finger-to-nose, rapid alternating movements and heel-to-shin  normal bilaterally without dysmetria.    Gait  Casual gait is normal including stance, stride, and arm swing.        Imaging: (independent review and interpretation)  No radiology results for the last 30 days.            Results  Imaging  MRI shows a very small area that is high grade and a good bit of low grade as well in the frontal lobes.    ASSESSMENT and PLAN  Ap Palmer is a 72 y.o. male with a significant comorbidity of obstructive sleep apnea. He originally presented with seizures and confusion and returns today for follow-up and review of results after intracranial stereotactic biopsy. Physical exam findings of neurologically intact with some flattened affect.  His imaging, including midline contrast-enhancing mass and bifrontal FLAIR signal consistent with diffuse glioma with high-grade component.    Glioblastoma Multiforme (WHO IV)  Surgical resection remains the mainstay of treatment.      Diagnosis:      Prognosis  There is a continuous correlation between the tumor resection and survival when at least 78% of the tumor volume is removed (Juan Pablo etc JNeurosurg 2011;115(1):3-8).  The exception is that the survival benefit is lost is significant neurological deficit results from the surgery (Rhaman).            Overlay of survival curves for total, subtotal and partial resections.    Median survival by extent of resection (EOR):   78% EOR = 12.5 months  80% EOR = 12.8 months  > 90% EOR = 13.8 months   100% EOR = 16 months median survival.    EORTC online calculator (https://www.eortc.be/tools/gbmcalculator/model1.aspx) based on treatment paradigm, age, extent of resection, Mini-Mental Status score examination, and use of corticosteroids indicates that Ap's predicted probability of survival at 2 years is 18% and median survival is 13.6 months.      [email or web address]        Radiotherapy  Standard treatment recommendations call for the use of postoperative adjuvant  fractionated radiotherapy.  Typical radiotherapy dose is 33 fractions of 1.8 Gy with a total of 59.4 Gy.  Radiation is directed to the remaining regions of contrast-enhancement and flair with a safety margin of 2 cm beyond the FLAIR.  A referral to radiation oncology will be placed today.    Chemotherapy  Current standard of care is surgical resection followed by radiotherapy and chemotherapy with temozolomide.  This protocol was proposed in a randomized phase 3 trial.  This [place] protocol involvs daily temozolomide with the radiation dose over the 6-week radiation treatment.,  Followed by an additional 5-day monthly cycle for 6 months.  Compared with radiation alone the combined regimen improved to year survival rate from 10.4 to 26.5% and the median survival increased from 12 to 15 months.  If tolerant temozolomide is now extended well past 6 months.  In contrast WHO grade 3 gliomas are often treated with either radiotherapy or chemotherapy alone.  This is based on the neuro-oncology working group of the [employer] MINAL-04 trial, in which 392 patients were randomized to fractionated radiotherapy or chemotherapy with either temozolomide or PCV.  There was demonstration of unchanged progression free survival and overall survival between the treatment groups.  This provided the ability to reserve either radiation or chemotherapy for salvage treatment at the recurrence.  Referral to oncology has been placed        Alternative therapies  Optune device was discussed with the patient she is elected not to proceed.    Recurrence  Unfortunately despite intensive first-line therapy, tumor recurrence occurs in virtually all patients.  Second line therapies include treatment with anti-angiogenic compounds such as anti-VEGF, bevacizumab, and repeat radiation or surgery.  Risk of reirradiation remains the risk of radiation necrosis.  Today cumulative radiation injury has not been a significant factor when appropriate  interval is observed between treatments.  Moreover, early data have shown that reirradiation with fractionated radiotherapy doses ranging from 30 to 42 Gy and cumulative doses of greater than 100 Gy can extend progression free survival rates for up to half of patients (Christiano et al Cancer Tonia 2013;331(2):139-146).  Finally in a majority of tumor recurrences occur locally, reoperation may be appropriate for a subset of patients depending on the age, KPS status, and proximity of the recurrent lesion to eloquent brain regions.    Molecular markers  Several markers with prognostic potential have been reported.  Among these are the DNA repair enzyme MGMT, a complete co-deletion of chromosome arms 1p and 19q, mutations in IDH-1, and the amplification of EGFR.      Patients in whom MGMT promoter is methylated demonstrated a significant improvement in response to combination therapy with temozolomide and fractionated radiotherapy.  This translates into a 2-year survival rate improvement from 13 to 46%. Ap's MGMT promoter is methylated    Similarly, 1p19q co-deletions confer significant improvement in prognosis, translating into a median survival of 3 to 6 years.    IDH-1/2 or found primarily in grade 3 lesions that accelerate and rarely in primary grade IV lesions.  These markers confer a more favorable prognosis. Ap has no evidence of IDH mutation    Finally, amplification of the EGFR results in overexpression of a constituent typically active EGFR receptor variant #3.  Mutation appears to increase the proliferative capacity of tumor cells and confers a worse prognosis.    Seizure prophylaxis  Recommend Keppra for 30 days post craniectomy.      Imaging:  No formal clinical trials define the optimal frequency for followup after treatment. However, the NCCN recommends that a repeat MRI should be obtained in patients with a malignant glioma about 4 weeks after completion of radiation therapy and every 2 - 4 months for 2 -  3 years then less frequently thereafter.    Assessment & Plan  1. Glioblastoma, IDH wild-type, WHO grade 4.  The initial diagnosis of diffuse midline glioma, H3 K27M mutant, was revised to glioblastoma, IDH wild-type, WHO grade 4, following the identification of a laboratory error. This change does not alter the treatment protocol. He is currently undergoing radiation therapy 5 days a week and is on temozolomide 180 mg for 42 days. He is also on low-dose steroids to manage edema and seizure activity. No new seizures, weakness, or numbness have been reported. Cognitive function is generally good, with occasional slowness. He has not experienced any nausea but reports mild fatigue. The potential use of Optune and proton therapy was discussed, including its benefits and limitations. He was informed that while there is no cure for his condition, the progression can be slowed, maintaining a good quality of life. He will continue with the current treatment regimen. An MRI is scheduled for 04/29/2025, to monitor the effectiveness of the treatment. He is advised to take vitamin B12 supplements, which will not interfere with his chemotherapy. If the current treatment proves ineffective, second and third-line treatments, including Avastin and PVC, will be considered.    Follow-up  The patient will follow up after the completion of his radiation therapy and subsequent MRI scan.    PROCEDURE  The patient underwent a biopsy for grade 4 glioma.        Return for AS SCHEDULED.  Diagnoses and all orders for this visit:    1. GBM (glioblastoma multiforme) (Primary)    2. Class 2 severe obesity due to excess calories with serious comorbidity and body mass index (BMI) of 37.0 to 37.9 in adult          Thank you for this Consultation and the opportunity to participate in Ap's care.    Sincerely,  Santy Kirby MD    I spent 24 minutes caring for Ap on this date of service. This time includes time spent by me in the  following activities: preparing for the visit, reviewing tests, obtaining and/or reviewing a separately obtained history, performing a medically appropriate examination and/or evaluation, counseling and educating the patient/family/caregiver, ordering medications, tests, or procedures, referring and communicating with other health care professionals, documenting information in the medical record, independently interpreting results and communicating that information with the patient/family/caregiver, and/or care coordination.                Initial (On Arrival)